# Patient Record
Sex: MALE | HISPANIC OR LATINO | Employment: FULL TIME | URBAN - METROPOLITAN AREA
[De-identification: names, ages, dates, MRNs, and addresses within clinical notes are randomized per-mention and may not be internally consistent; named-entity substitution may affect disease eponyms.]

---

## 2017-02-15 ENCOUNTER — OFFICE VISIT (OUTPATIENT)
Dept: URGENT CARE | Facility: CLINIC | Age: 37
End: 2017-02-15
Payer: COMMERCIAL

## 2017-02-15 VITALS
TEMPERATURE: 98 F | DIASTOLIC BLOOD PRESSURE: 70 MMHG | HEIGHT: 70 IN | OXYGEN SATURATION: 97 % | BODY MASS INDEX: 28.92 KG/M2 | HEART RATE: 77 BPM | RESPIRATION RATE: 14 BRPM | SYSTOLIC BLOOD PRESSURE: 118 MMHG | WEIGHT: 202 LBS

## 2017-02-15 DIAGNOSIS — J02.0 STREP THROAT: ICD-10-CM

## 2017-02-15 LAB
INT CON NEG: NEGATIVE
INT CON POS: POSITIVE
S PYO AG THROAT QL: POSITIVE

## 2017-02-15 PROCEDURE — 87880 STREP A ASSAY W/OPTIC: CPT | Performed by: PHYSICIAN ASSISTANT

## 2017-02-15 PROCEDURE — 99214 OFFICE O/P EST MOD 30 MIN: CPT | Performed by: PHYSICIAN ASSISTANT

## 2017-02-15 RX ORDER — FLUTICASONE PROPIONATE 50 MCG
1 SPRAY, SUSPENSION (ML) NASAL 2 TIMES DAILY
Qty: 16 G | Refills: 0 | Status: SHIPPED | OUTPATIENT
Start: 2017-02-15 | End: 2021-12-20

## 2017-02-15 RX ORDER — AMOXICILLIN AND CLAVULANATE POTASSIUM 875; 125 MG/1; MG/1
1 TABLET, FILM COATED ORAL 2 TIMES DAILY
Qty: 20 TAB | Refills: 0 | Status: SHIPPED | OUTPATIENT
Start: 2017-02-15 | End: 2021-12-20

## 2017-02-15 NOTE — PROGRESS NOTES
Chief Complaint   Patient presents with   • Pharyngitis     x 1 month, pt states is worse at night       HISTORY OF PRESENT ILLNESS: Patient is a 36 y.o. male who presents today for approx 1 month of waxing and waning severity of sore throat.  Overall it is getting worse and overall it is worse at night.  He states he has had some sinus pressure that seems to swell and alternate daily.  He has not had fevers.   Has not had nausea or vomiting.   No abdominal pain but has been gassy.  He has used Afrin with minimal improvement.    Throat feels swollen.       There are no active problems to display for this patient.      Allergies:Review of patient's allergies indicates no known allergies.    Current Outpatient Prescriptions Ordered in [facility]   Medication Sig Dispense Refill   • hydrocodone-acetaminophen (NORCO) 5-325 MG Tab per tablet Take 1-2 Tabs by mouth every four hours as needed. 35 Tab 0   • Naphazoline-Pheniramine (NAPHCON-A OP) by Ophthalmic route.     • Ibuprofen (ADVIL) 200 MG CAPS Take  by mouth.     • polymixin-trimethoprim (POLYTRIM) 16610-9.1 UNIT/ML-% SOLN Place 2 Drops in left eye. One acute drops to affected eye every 4-6 hours for 10 days. 1 Bottle 0   • NON SPECIFIED Gambian PAIN PILLS        No current Epic-ordered facility-administered medications on file.       Past Medical History   Diagnosis Date   • Acid reflux        Social History   Substance Use Topics   • Smoking status: Never Smoker    • Smokeless tobacco: None   • Alcohol Use: No       No family status information on file.   History reviewed. No pertinent family history.    ROS:  Review of Systems   Constitutional: Negative for fever, chills, weight loss and malaise/fatigue.   HENT: SEE HPI  Eyes: Negative for blurred vision.   Respiratory: Negative for cough, sputum production, shortness of breath and wheezing.    Cardiovascular: Negative for chest pain, palpitations, orthopnea and leg swelling.   Gastrointestinal: Negative for heartburn,  "nausea, vomiting and abdominal pain.   All other systems reviewed and are negative.       Exam:  Blood pressure 118/70, pulse 77, temperature 36.7 °C (98 °F), resp. rate 14, height 1.765 m (5' 9.5\"), weight 91.627 kg (202 lb), SpO2 97 %.  General:  Well nourished, well developed male in NAD  Eyes: PERRLA, EOM within normal limits, no conjunctival injection, no scleral icterus, visual fields and acuity grossly intact.  Ears: Normal shape and symmetry, no tenderness, no discharge. External canals are without any significant edema or erythema. Tympanic membranes are without any inflammation, no effusion. Gross auditory acuity is intact  Nose: Symmetrical, sinuses without tenderness, boggy turbinates, clear rhinorrhea.   Mouth: reasonable hygiene, mild diffuse erythema without exudates and mild bilateral tonsillar enlargement.   Neck: no masses, range of motion within normal limits, no tracheal deviation. No lymphadenopathy  Pulmonary: Normal respiratory effort, no wheezes, crackles, or rhonchi.  Cardiovascular: regular rate and rhythm without murmurs, rubs, or gallops.  Skin: No visible rashes or lesion. Warm, pink, dry.   Extremities: no clubbing, cyanosis, or edema.  Neuro: A&O x 3. Speech normal/clear.  Normal gait.         Assessment/Plan:  1. Strep throat  POCT Rapid Strep A    amoxicillin-clavulanate (AUGMENTIN) 875-125 MG Tab    fluticasone (FLONASE) 50 MCG/ACT nasal spray       -POCT strep -POS  -fluids emphasized. Alternating Tylenol/Motrin prn pain/inflammation/fever  -additionally discussed sinus care.  Recommend stop Afrin and will trial Flonase.   -salt gargles.   -new tooth brush.  -RTC precautions discussed such as worsening sore throat despite abx, worsening fevers, increased difficulty swallowing or breathing, drooling, etc.     Supportive care, differential diagnoses, and indications for immediate follow-up discussed with patient.   Pathogenesis of diagnosis discussed including typical length and " natural progression.   Instructed to return to clinic or nearest emergency department for any change in condition, further concerns, or worsening of symptoms.  Patient states understanding of the plan of care and discharge instructions.  Instructed to make an appointment, for follow up, with their primary care provider.      Caitlyn Evans PA-C

## 2017-02-15 NOTE — MR AVS SNAPSHOT
"        Holden Chelo   2/15/2017 8:15 AM   Office Visit   MRN: [id number]    Department:  [facility]   Dept Phone:  [phone]    Description:  Male : 1980   Provider:  Caitlyn Evans PA-C           Reason for Visit     Pharyngitis x 1 month, pt states is worse at night      Allergies as of 2/15/2017     No Known Allergies      You were diagnosed with     Acute pharyngitis due to other specified organisms   [[id number]]         Vital Signs     Blood Pressure Pulse Temperature Respirations Height Weight    118/70 mmHg 77 36.7 °C (98 °F) 14 1.765 m (5' 9.5\") 91.627 kg (202 lb)    Body Mass Index Oxygen Saturation                29.41 kg/m2 97%          Basic Information     Date Of Birth Sex Race Ethnicity Preferred Language    1980 Male  or   Origin (Turkish,Qatari,Kazakh,Mickey, etc) English      Health Maintenance        Date Due Completion Dates    IMM DTaP/Tdap/Td Vaccine (1 - Tdap) 3/21/1998 ---    IMM INFLUENZA (1) 2016 ---            Current Immunizations     No immunizations on file.      Below and/or attached are the medications your provider expects you to take. Review all of your home medications and newly ordered medications with your provider and/or pharmacist. Follow medication instructions as directed by your provider and/or pharmacist. Please keep your medication list with you and share with your provider. Update the information when medications are discontinued, doses are changed, or new medications (including over-the-counter products) are added; and carry medication information at all times in the event of emergency situations     Allergies:  No Known Allergies          Medications  Valid as of: February 15, 2017 -  8:23 AM    Generic Name Brand Name Tablet Size Instructions for use    Hydrocodone-Acetaminophen (Tab) NORCO 5-325 MG Take 1-2 Tabs by mouth every four hours as needed.        Ibuprofen (Cap) Ibuprofen 200 MG Take  by mouth.       " Naphazoline-Pheniramine   by Ophthalmic route.        NON SPECIFIED   Panamanian PAIN PILLS         Polymyxin B-Trimethoprim (Solution) POLYTRIM 10724-8.1 UNIT/ML-% Place 2 Drops in left eye. One acute drops to affected eye every 4-6 hours for 10 days.        .                 Medicines prescribed today were sent to:     [facility] [facility] [place] - [address]    [address] [place] [address]    Phone: [phone] Fax: [phone]    Open 24 Hours?: No      Medication refill instructions:       If your prescription bottle indicates you have medication refills left, it is not necessary to call your provider’s office. Please contact your pharmacy and they will refill your medication.    If your prescription bottle indicates you do not have any refills left, you may request refills at any time through one of the following ways: The online BrightArch system (except Urgent Care), by calling your provider’s office, or by asking your pharmacy to contact your provider’s office with a refill request. Medication refills are processed only during regular business hours and may not be available until the next business day. Your provider may request additional information or to have a follow-up visit with you prior to refilling your medication.   *Please Note: Medication refills are assigned a new Rx number when refilled electronically. Your pharmacy may indicate that no refills were authorized even though a new prescription for the same medication is available at the pharmacy. Please request the medicine by name with the pharmacy before contacting your provider for a refill.           BrightArch Access Code: [email or web address]  Expires: 3/17/2017  8:23 AM    BrightArch  A secure, online tool to manage your health information     [employer]s BrightArch® is a secure, online tool that connects you to your personalized health information from the privacy of your home -- day or night - making it very easy for you to manage your  healthcare. Once the activation process is completed, you can even access your medical information using the Crackle darrell, which is available for free in the Apple Darrell store or Google Play store.     Crackle provides the following levels of access (as shown below):   My Chart Features   [facility] Doctor [facility]  Specialists [facility]  Urgent  Care Non-[facility]  Primary Care  Doctor   Email your healthcare team securely and privately 24/7 X X X    Manage appointments: schedule your next appointment; view details of past/upcoming appointments X      Request prescription refills. X      View recent personal medical records, including lab and immunizations X X X X   View health record, including health history, allergies, medications X X X X   Read reports about your outpatient visits, procedures, consult and ER notes X X X X   See your discharge summary, which is a recap of your hospital and/or ER visit that includes your diagnosis, lab results, and care plan. X X       How to register for Crackle:  1. Go to  [email or web address].  2. Click on the Sign Up Now box, which takes you to the New Member Sign Up page. You will need to provide the following information:  a. Enter your Crackle Access Code exactly as it appears at the top of this page. (You will not need to use this code after you’ve completed the sign-up process. If you do not sign up before the expiration date, you must request a new code.)   b. Enter your date of birth.   c. Enter your home email address.   d. Click Submit, and follow the next screen’s instructions.  3. Create a Crackle ID. This will be your Crackle login ID and cannot be changed, so think of one that is secure and easy to remember.  4. Create a Crackle password. You can change your password at any time.  5. Enter your Password Reset Question and Answer. This can be used at a later time if you forget your password.   6. Enter your e-mail address. This allows you to receive e-mail  notifications when new information is available in [employer]Mobhart.  7. Click Sign Up. You can now view your health information.    For assistance activating your [employer] account, call [phone]

## 2017-04-14 ENCOUNTER — OFFICE VISIT (OUTPATIENT)
Dept: URGENT CARE | Facility: CLINIC | Age: 37
End: 2017-04-14

## 2017-04-14 VITALS
WEIGHT: 202 LBS | RESPIRATION RATE: 16 BRPM | SYSTOLIC BLOOD PRESSURE: 126 MMHG | OXYGEN SATURATION: 96 % | HEIGHT: 69 IN | DIASTOLIC BLOOD PRESSURE: 64 MMHG | HEART RATE: 72 BPM | BODY MASS INDEX: 29.92 KG/M2 | TEMPERATURE: 97.9 F

## 2017-04-14 DIAGNOSIS — R10.84 GENERALIZED ABDOMINAL PAIN: ICD-10-CM

## 2017-04-14 DIAGNOSIS — R19.7 DIARRHEA, UNSPECIFIED TYPE: ICD-10-CM

## 2017-04-14 DIAGNOSIS — R14.0 GENERALIZED BLOATING: ICD-10-CM

## 2017-04-14 LAB
APPEARANCE UR: CLEAR
BILIRUB UR STRIP-MCNC: NORMAL MG/DL
COLOR UR AUTO: YELLOW
GLUCOSE UR STRIP.AUTO-MCNC: NORMAL MG/DL
KETONES UR STRIP.AUTO-MCNC: NORMAL MG/DL
LEUKOCYTE ESTERASE UR QL STRIP.AUTO: NORMAL
NITRITE UR QL STRIP.AUTO: NORMAL
PH UR STRIP.AUTO: 5 [PH] (ref 5–8)
PROT UR QL STRIP: NORMAL MG/DL
RBC UR QL AUTO: NORMAL
SP GR UR STRIP.AUTO: 1.03
UROBILINOGEN UR STRIP-MCNC: NORMAL MG/DL

## 2017-04-14 PROCEDURE — 81002 URINALYSIS NONAUTO W/O SCOPE: CPT | Performed by: NURSE PRACTITIONER

## 2017-04-14 PROCEDURE — 99214 OFFICE O/P EST MOD 30 MIN: CPT | Performed by: NURSE PRACTITIONER

## 2017-04-14 RX ORDER — LOPERAMIDE HYDROCHLORIDE 2 MG/1
2 CAPSULE ORAL 4 TIMES DAILY PRN
Qty: 30 CAP | Refills: 0 | Status: SHIPPED | OUTPATIENT
Start: 2017-04-14 | End: 2021-12-20

## 2017-04-14 RX ORDER — SIMETHICONE 125 MG
1 CAPSULE ORAL
Qty: 28 CAP | Refills: 0 | Status: SHIPPED | OUTPATIENT
Start: 2017-04-14 | End: 2021-12-20

## 2017-04-14 ASSESSMENT — ENCOUNTER SYMPTOMS
FEVER: 0
HEADACHES: 0
BLOOD IN STOOL: 0
VOMITING: 0
DIARRHEA: 1
NAUSEA: 0
WEAKNESS: 0
CONSTIPATION: 0
BACK PAIN: 0
PALPITATIONS: 0
MYALGIAS: 0
ABDOMINAL PAIN: 1
CHILLS: 0
DIZZINESS: 0
SHORTNESS OF BREATH: 0
ORTHOPNEA: 0

## 2017-04-14 NOTE — PROGRESS NOTES
"Subjective:      Holden Whitney is a 37 y.o. male who presents with Diarrhea            Diarrhea   Associated symptoms include abdominal pain. Pertinent negatives include no chills, fever, headaches, myalgias or vomiting.   Holden is a 37 year old male who is here for diarrhea and abdominal pain since last night. Diarrhea this morning \"4 times\" but has not eaten or drank anything today. Cramping. Distended. Denies n/v. Diarrhea last night after dinner 5 hours later. Denies fever. Queasy feeing. Denies dizziness.     PMH:  has a past medical history of Acid reflux.  MEDS:   Current outpatient prescriptions:   •  amoxicillin-clavulanate (AUGMENTIN) 875-125 MG Tab, Take 1 Tab by mouth 2 times a day., Disp: 20 Tab, Rfl: 0  •  fluticasone (FLONASE) 50 MCG/ACT nasal spray, Spray 1 Spray in nose 2 times a day., Disp: 16 g, Rfl: 0  •  hydrocodone-acetaminophen (NORCO) 5-325 MG Tab per tablet, Take 1-2 Tabs by mouth every four hours as needed., Disp: 35 Tab, Rfl: 0  •  Naphazoline-Pheniramine (NAPHCON-A OP), by Ophthalmic route., Disp: , Rfl:   •  Ibuprofen (ADVIL) 200 MG CAPS, Take  by mouth., Disp: , Rfl:   •  polymixin-trimethoprim (POLYTRIM) 39853-6.1 UNIT/ML-% SOLN, Place 2 Drops in left eye. One acute drops to affected eye every 4-6 hours for 10 days., Disp: 1 Bottle, Rfl: 0  •  NON SPECIFIED, Danish PAIN PILLS , Disp: , Rfl:   ALLERGIES: No Known Allergies  SURGHX:   Past Surgical History   Procedure Laterality Date   • Appendectomy laparoscopic N/A 1/14/2016     Procedure: APPENDECTOMY LAPAROSCOPIC;  Surgeon: Geo Knutson M.D.;  Location: SURGERY [facility];  Service:      SOCHX:  reports that he has never smoked. He does not have any smokeless tobacco history on file. He reports that he does not drink alcohol or use illicit drugs.  FH: Family history was reviewed, no pertinent findings to report      Review of Systems   Constitutional: Negative for fever, chills and malaise/fatigue.   Respiratory: Negative for " "shortness of breath.    Cardiovascular: Negative for chest pain, palpitations and orthopnea.   Gastrointestinal: Positive for abdominal pain and diarrhea. Negative for nausea, vomiting, constipation and blood in stool.   Genitourinary: Negative for dysuria.   Musculoskeletal: Negative for myalgias and back pain.   Neurological: Negative for dizziness, weakness and headaches.   All other systems reviewed and are negative.         Objective:     /64 mmHg  Pulse 72  Temp(Src) 36.6 °C (97.9 °F)  Resp 16  Ht 1.753 m (5' 9.02\")  Wt 91.627 kg (202 lb)  BMI 29.82 kg/m2  SpO2 96%     Physical Exam   Constitutional: He is oriented to person, place, and time. Vital signs are normal. He appears well-developed and well-nourished.  Non-toxic appearance. He does not have a sickly appearance. He does not appear ill. No distress.   HENT:   Head: Normocephalic.   Eyes: Conjunctivae and EOM are normal. Pupils are equal, round, and reactive to light.   Neck: Normal range of motion. Neck supple.   Cardiovascular: Normal rate and regular rhythm.    Pulmonary/Chest: Effort normal and breath sounds normal.   Abdominal: Soft. Bowel sounds are normal. He exhibits no distension. There is no tenderness. There is no rigidity, no rebound, no guarding and no CVA tenderness.   Musculoskeletal: Normal range of motion.   Lymphadenopathy:     He has no cervical adenopathy.   Neurological: He is alert and oriented to person, place, and time.   Skin: Skin is warm and dry. He is not diaphoretic.   Vitals reviewed.    POC Color yellow Negative Final      POC Appearance clear Negative Final     POC Leukocyte Esterase n Negative Final     POC Nitrites n Negative Final     POC Urobiligen n Negative (0.2) mg/dL Final     POC Protein n Negative mg/dL Final     POC Urine PH 5.0 5.0 - 8.0 Final     POC Blood n Negative Final     POC Specific Gravity 1.030 <1.005 - >1.030 Final     POC Ketones mod Negative mg/dL Final     POC Biliruben n Negative " mg/dL Final     POC Glucose n Negative mg/dL Final               Assessment/Plan:     1. Generalized abdominal pain    - POCT Urinalysis    2. Diarrhea, unspecified type    - loperamide (IMODIUM) 2 MG Cap; Take 1 Cap by mouth 4 times a day as needed for Diarrhea.  Dispense: 30 Cap; Refill: 0  - Simethicone 125 MG Cap; Take 1 Cap by mouth 4 Times a Day,Before Meals and at Bedtime.  Dispense: 28 Cap; Refill: 0    3. Generalized bloating    - loperamide (IMODIUM) 2 MG Cap; Take 1 Cap by mouth 4 times a day as needed for Diarrhea.  Dispense: 30 Cap; Refill: 0  - Simethicone 125 MG Cap; Take 1 Cap by mouth 4 Times a Day,Before Meals and at Bedtime.  Dispense: 28 Cap; Refill: 0    Maintain water status slowly with sips of water and electrolyte fluid, increase to larger amounts as tolerated  Introduce solid foods when diarrhea ceases and becomes firmer without abdominal cramping. Eat items from the BRAT diet- trying small amount with one item at a time  May use Lanolin, diaper rash ointment or Vaseline to soothe anus for raw skin  Monitor for fever, increased abdominal pain, n/v, lethargy, continued diarrhea- need re-evaluation

## 2017-04-14 NOTE — MR AVS SNAPSHOT
"        Holden Whitney   2017 2:15 PM   Office Visit   MRN: [id number]    Department:  [facility]   Dept Phone:  [phone]    Description:  Male : 1980   Provider:  JOSEPH Jonas           Reason for Visit     Diarrhea w/generalized abdominal pain x 1 day      Allergies as of 2017     No Known Allergies      You were diagnosed with     Generalized abdominal pain   [[id number]]       Diarrhea, unspecified type   [5683770]       Generalized bloating   [[id number]]         Vital Signs     Blood Pressure Pulse Temperature Respirations Height Weight    126/64 mmHg 72 36.6 °C (97.9 °F) 16 1.753 m (5' 9.02\") 91.627 kg (202 lb)    Body Mass Index Oxygen Saturation Smoking Status             29.82 kg/m2 96% Never Smoker          Basic Information     Date Of Birth Sex Race Ethnicity Preferred Language    1980 Male  or   Origin (Guamanian,Singaporean,Surinamese,British [place] Islander, etc) English      Health Maintenance        Date Due Completion Dates    IMM DTaP/Tdap/Td Vaccine (1 - Tdap) 3/21/1998 ---            Results     POCT Urinalysis      Component Value Standard Range & Units    POC Color yellow Negative    POC Appearance clear Negative    POC Leukocyte Esterase n Negative    POC Nitrites n Negative    POC Urobiligen n Negative (0.2) mg/dL    POC Protein n Negative mg/dL    POC Urine PH 5.0 5.0 - 8.0    POC Blood n Negative    POC Specific Gravity 1.030 <1.005 - >1.030    POC Ketones mod Negative mg/dL    POC Biliruben n Negative mg/dL    POC Glucose n Negative mg/dL                        Current Immunizations     No immunizations on file.      Below and/or attached are the medications your provider expects you to take. Review all of your home medications and newly ordered medications with your provider and/or pharmacist. Follow medication instructions as directed by your provider and/or pharmacist. Please keep your medication list with you and share with your provider. Update the " information when medications are discontinued, doses are changed, or new medications (including over-the-counter products) are added; and carry medication information at all times in the event of emergency situations     Allergies:  No Known Allergies          Medications  Valid as of: April 14, 2017 -  3:15 PM    Generic Name Brand Name Tablet Size Instructions for use    Amoxicillin-Pot Clavulanate (Tab) AUGMENTIN 875-125 MG Take 1 Tab by mouth 2 times a day.        Fluticasone Propionate (Suspension) FLONASE 50 MCG/ACT Spray 1 Spray in nose 2 times a day.        Hydrocodone-Acetaminophen (Tab) NORCO 5-325 MG Take 1-2 Tabs by mouth every four hours as needed.        Ibuprofen (Cap) Ibuprofen 200 MG Take  by mouth.        Loperamide HCl (Cap) IMODIUM 2 MG Take 1 Cap by mouth 4 times a day as needed for Diarrhea.        Naphazoline-Pheniramine   by Ophthalmic route.        NON SPECIFIED   Spanish PAIN PILLS         Polymyxin B-Trimethoprim (Solution) POLYTRIM 79654-0.1 UNIT/ML-% Place 2 Drops in left eye. One acute drops to affected eye every 4-6 hours for 10 days.        Simethicone (Cap) Simethicone 125 MG Take 1 Cap by mouth 4 Times a Day,Before Meals and at Bedtime.        .                 Medicines prescribed today were sent to:     [facility] [address] [place], [place] [address] [address] [facility] [address] [place] [address]    Phone: [phone] Fax: [phone]    Open 24 Hours?: No      Medication refill instructions:       If your prescription bottle indicates you have medication refills left, it is not necessary to call your provider’s office. Please contact your pharmacy and they will refill your medication.    If your prescription bottle indicates you do not have any refills left, you may request refills at any time through one of the following ways: The online Decisyon system (except Urgent Care), by calling your provider’s office, or by asking your pharmacy to contact your provider’s office with a refill request.  Medication refills are processed only during regular business hours and may not be available until the next business day. Your provider may request additional information or to have a follow-up visit with you prior to refilling your medication.   *Please Note: Medication refills are assigned a new Rx number when refilled electronically. Your pharmacy may indicate that no refills were authorized even though a new prescription for the same medication is available at the pharmacy. Please request the medicine by name with the pharmacy before contacting your provider for a refill.           MyChart Status: Patient Declined

## 2021-12-20 ENCOUNTER — OFFICE VISIT (OUTPATIENT)
Dept: URGENT CARE | Facility: PHYSICIAN GROUP | Age: 41
End: 2021-12-20
Payer: COMMERCIAL

## 2021-12-20 VITALS
OXYGEN SATURATION: 97 % | HEART RATE: 85 BPM | HEIGHT: 66 IN | WEIGHT: 200.6 LBS | DIASTOLIC BLOOD PRESSURE: 72 MMHG | RESPIRATION RATE: 16 BRPM | SYSTOLIC BLOOD PRESSURE: 120 MMHG | BODY MASS INDEX: 32.24 KG/M2 | TEMPERATURE: 98 F

## 2021-12-20 DIAGNOSIS — J02.9 SORE THROAT: ICD-10-CM

## 2021-12-20 DIAGNOSIS — R11.2 NAUSEA AND VOMITING, INTRACTABILITY OF VOMITING NOT SPECIFIED, UNSPECIFIED VOMITING TYPE: ICD-10-CM

## 2021-12-20 DIAGNOSIS — J02.0 STREP THROAT: Primary | ICD-10-CM

## 2021-12-20 DIAGNOSIS — R19.7 DIARRHEA, UNSPECIFIED TYPE: ICD-10-CM

## 2021-12-20 DIAGNOSIS — R10.9 ABDOMINAL PAIN, UNSPECIFIED ABDOMINAL LOCATION: ICD-10-CM

## 2021-12-20 LAB
INT CON NEG: NORMAL
INT CON POS: NORMAL
S PYO AG THROAT QL: POSITIVE

## 2021-12-20 PROCEDURE — 87880 STREP A ASSAY W/OPTIC: CPT | Performed by: NURSE PRACTITIONER

## 2021-12-20 PROCEDURE — 99214 OFFICE O/P EST MOD 30 MIN: CPT | Performed by: NURSE PRACTITIONER

## 2021-12-20 RX ORDER — ONDANSETRON 4 MG/1
4 TABLET, ORALLY DISINTEGRATING ORAL EVERY 8 HOURS PRN
Qty: 20 TABLET | Refills: 0 | Status: SHIPPED | OUTPATIENT
Start: 2021-12-20 | End: 2022-01-31

## 2021-12-20 RX ORDER — AMOXICILLIN 500 MG/1
500 CAPSULE ORAL 2 TIMES DAILY
Qty: 20 CAPSULE | Refills: 0 | Status: SHIPPED | OUTPATIENT
Start: 2021-12-20 | End: 2021-12-30

## 2021-12-20 RX ORDER — DIPHENOXYLATE HYDROCHLORIDE AND ATROPINE SULFATE 2.5; .025 MG/1; MG/1
1 TABLET ORAL 4 TIMES DAILY PRN
Qty: 20 TABLET | Refills: 0 | Status: SHIPPED | OUTPATIENT
Start: 2021-12-20 | End: 2021-12-25

## 2021-12-20 ASSESSMENT — ENCOUNTER SYMPTOMS
SHORTNESS OF BREATH: 0
ABDOMINAL PAIN: 1
DIARRHEA: 1
NAUSEA: 1
ROS GI COMMENTS: NO CHANGE IN GI
VOMITING: 0
MYALGIAS: 0
CHILLS: 0
FEVER: 0
SENSORY CHANGE: 0
FOCAL WEAKNESS: 0
BACK PAIN: 0
CONSTIPATION: 0
TINGLING: 0
HEADACHES: 0
COUGH: 0
SORE THROAT: 1

## 2021-12-20 ASSESSMENT — LIFESTYLE VARIABLES: SUBSTANCE_ABUSE: 0

## 2021-12-20 NOTE — LETTER
Holden Whitney had an appointment with us today 12/20/2021. Please excuse Walter Ruiz from work today as he had to accompany the patient to his medical appointment.        Thank you,         Adriana Marmolejo, MADELIENEPParrisN.  Electronically Signed

## 2021-12-20 NOTE — LETTER
December 20, 2021       Patient: Holden Whitney   YOB: 1980   Date of Visit: 12/20/2021         To Whom It May Concern:    In my medical opinion, I recommend that Holden Whitney return to full duty,  tomorrow without work restrictions.    If you have any questions or concerns, please don't hesitate to call [phone]          Sincerely,          Adriana Marmolejo, A.P.N.  Electronically Signed

## 2021-12-20 NOTE — PROGRESS NOTES
Holden Whitney is a 41 y.o. male who presents for Abdominal Pain (diarrhea, sore throat, nausea, vomiting, x2 days )    : Abdias MONK   HPI this new problem.  Urinalysis 41-year-old male patient presents with abdominal pain, diarrhea, sore throat, nausea, vomiting for 2 days.  Symptoms are slowly getting worse.  He has been taking diclofenac to reduce the inflammation in his stomach.  He reports that he is having diarrhea more than 10 times a day.  There is no blood.  He has vomiting about 5 times a day.  He has had abdominal cramping.  His throat is very sore.  He does not drink alcohol.  He has no ill contacts.  No exposure to persons with strep throat or other infectious diseases.  He has not had any antibiotics in over 6 months.  No other aggravating or alleviating factors.    Review of Systems   Constitutional: Negative for chills, fever and malaise/fatigue.   HENT: Positive for sore throat. Negative for congestion.    Respiratory: Negative for cough and shortness of breath.    Cardiovascular: Negative for chest pain.   Gastrointestinal: Positive for abdominal pain, diarrhea and nausea. Negative for constipation and vomiting.        No change in GI   Genitourinary: Negative for dysuria and frequency.        No change in    Musculoskeletal: Negative for back pain and myalgias.   Skin: Negative for rash.   Neurological: Negative for tingling, sensory change, focal weakness and headaches.   Endo/Heme/Allergies: Negative for environmental allergies.   Psychiatric/Behavioral: Negative for substance abuse.       Allergies:     No Known Allergies    PMSFS Hx:  Past Medical History:   Diagnosis Date   • Acid reflux      Past Surgical History:   Procedure Laterality Date   • APPENDECTOMY LAPAROSCOPIC N/A 1/14/2016    Procedure: APPENDECTOMY LAPAROSCOPIC;  Surgeon: Geo Knutson M.D.;  Location: SURGERY [facility];  Service:      History reviewed. No pertinent family history.  Social History  "    Tobacco Use   • Smoking status: Never Smoker   • Smokeless tobacco: Never Used   Substance Use Topics   • Alcohol use: No       Problems:   There is no problem list on file for this patient.      Medications:   No current outpatient medications on file prior to visit.     No current facility-administered medications on file prior to visit.          Objective:     /72 (BP Location: Left arm, Patient Position: Sitting, BP Cuff Size: Adult)   Pulse 85   Temp 36.7 °C (98 °F) (Temporal)   Resp 16   Ht 1.676 m (5' 6\")   Wt 91 kg (200 lb 9.6 oz)   SpO2 97%   BMI 32.38 kg/m²     Physical Exam  Vitals and nursing note reviewed.   Constitutional:       General: He is not in acute distress.     Appearance: Normal appearance. He is well-developed and normal weight.   HENT:      Head: Normocephalic.      Mouth/Throat:      Mouth: Mucous membranes are moist.      Pharynx: Posterior oropharyngeal erythema present. No oropharyngeal exudate.   Cardiovascular:      Rate and Rhythm: Normal rate and regular rhythm.      Pulses: Normal pulses.      Heart sounds: Normal heart sounds.   Pulmonary:      Effort: Pulmonary effort is normal.      Breath sounds: Normal breath sounds.   Chest:   Breasts:      Right: No supraclavicular adenopathy.      Left: No supraclavicular adenopathy.       Abdominal:      General: There is no distension.      Palpations: Abdomen is soft.      Tenderness: There is abdominal tenderness (generalized). There is no right CVA tenderness, left CVA tenderness, guarding or rebound.   Musculoskeletal:      Cervical back: Normal range of motion and neck supple.   Lymphadenopathy:      Head:      Right side of head: No tonsillar adenopathy.      Left side of head: No tonsillar adenopathy.      Cervical: No cervical adenopathy.      Upper Body:      Right upper body: No supraclavicular adenopathy.      Left upper body: No supraclavicular adenopathy.   Skin:     General: Skin is warm and dry.      " Capillary Refill: Capillary refill takes less than 2 seconds.   Neurological:      Mental Status: He is alert and oriented to person, place, and time.   Psychiatric:         Mood and Affect: Mood normal.         Speech: Speech normal.         Behavior: Behavior normal. Behavior is cooperative.         Thought Content: Thought content normal.       Rapid Strep A : positive      Assessment /Associated Orders:      1. Strep throat  amoxicillin (AMOXIL) 500 MG Cap   2. Diarrhea, unspecified type  diphenoxylate-atropine (LOMOTIL) 2.5-0.025 MG Tab   3. Nausea and vomiting, intractability of vomiting not specified, unspecified vomiting type  ondansetron (ZOFRAN ODT) 4 MG TABLET DISPERSIBLE   4. Abdominal pain, unspecified abdominal location     5. Sore throat  POCT Rapid Strep A         Medical Decision Making:    Pt is clinically stable at today's acute urgent care visit.  No acute distress noted. Appropriate for outpatient management at this time.   Acute problem today with uncertain prognosis.     Educated in proper administration of medication(s) ordered today including safety, possible SE, risks, benefits, rationale and alternatives to therapy.     Keep well hydrated    Pateros diet until sx improve    OTC  analgesic of choice (acetaminophen or NSAID). Follow manufactures dosing and safety precautions.     Stop diclofenac     Advised to follow-up with the primary care provider for recheck, reevaluation, and consideration of further management if necessary.   Discussed management options (risks,benefits, and alternatives to treatment). Expressed understanding and the treatment plan was agreed upon. Questions were encouraged and answered   Return to urgent care prn if new or worsening sx or if there is no improvement in condition prn.    Educated in Red flags and indications to immediately call 911 or present to the Emergency Department.     I personally reviewed prior external notes and test results pertinent to today's  visit.  I have independently reviewed and interpreted all diagnostics ordered during this urgent care acute visit.   Time spent evaluating this patient was at least 30 minutes and includes preparing for visit, counseling/education, exam and evaluation, obtaining history, independent interpretation, ordering lab/test/procedures,medication management and documentation.Time does not include separately billable procedures noted .

## 2022-01-31 ENCOUNTER — OFFICE VISIT (OUTPATIENT)
Dept: URGENT CARE | Facility: PHYSICIAN GROUP | Age: 42
End: 2022-01-31
Payer: COMMERCIAL

## 2022-01-31 VITALS
DIASTOLIC BLOOD PRESSURE: 70 MMHG | BODY MASS INDEX: 32.56 KG/M2 | HEIGHT: 66 IN | SYSTOLIC BLOOD PRESSURE: 112 MMHG | RESPIRATION RATE: 16 BRPM | HEART RATE: 75 BPM | WEIGHT: 202.6 LBS | TEMPERATURE: 98.2 F | OXYGEN SATURATION: 96 %

## 2022-01-31 DIAGNOSIS — M25.512 ACUTE PAIN OF LEFT SHOULDER: ICD-10-CM

## 2022-01-31 DIAGNOSIS — K58.2 IRRITABLE BOWEL SYNDROME WITH BOTH CONSTIPATION AND DIARRHEA: ICD-10-CM

## 2022-01-31 DIAGNOSIS — K21.9 GASTROESOPHAGEAL REFLUX DISEASE, UNSPECIFIED WHETHER ESOPHAGITIS PRESENT: ICD-10-CM

## 2022-01-31 PROCEDURE — 99214 OFFICE O/P EST MOD 30 MIN: CPT | Performed by: STUDENT IN AN ORGANIZED HEALTH CARE EDUCATION/TRAINING PROGRAM

## 2022-01-31 RX ORDER — PANTOPRAZOLE SODIUM 40 MG/1
40 TABLET, DELAYED RELEASE ORAL DAILY
Qty: 30 TABLET | Refills: 1 | Status: SHIPPED | OUTPATIENT
Start: 2022-01-31

## 2022-01-31 RX ORDER — DICYCLOMINE HCL 20 MG
TABLET ORAL
Qty: 30 TABLET | Refills: 0 | Status: SHIPPED | OUTPATIENT
Start: 2022-01-31

## 2022-01-31 RX ORDER — POLYETHYLENE GLYCOL 3350 17 G/17G
17 POWDER, FOR SOLUTION ORAL DAILY
Qty: 510 G | Refills: 1 | Status: SHIPPED | OUTPATIENT
Start: 2022-01-31

## 2022-01-31 NOTE — LETTER
January 31, 2022       Patient: Holden Whitney   YOB: 1980   Date of Visit: 1/31/2022         To Whom It May Concern:    Holden Whitney was seen for his doctor's appointment on the morning of 1/31/22     If you have any questions or concerns, please don't hesitate to call [phone]          Sincerely,          Robert Alegria D.O.  Electronically Signed

## 2022-12-01 ENCOUNTER — HOSPITAL ENCOUNTER (EMERGENCY)
Facility: MEDICAL CENTER | Age: 42
End: 2022-12-01
Attending: EMERGENCY MEDICINE
Payer: COMMERCIAL

## 2022-12-01 ENCOUNTER — APPOINTMENT (OUTPATIENT)
Dept: RADIOLOGY | Facility: MEDICAL CENTER | Age: 42
End: 2022-12-01
Attending: EMERGENCY MEDICINE
Payer: COMMERCIAL

## 2022-12-01 VITALS
OXYGEN SATURATION: 98 % | BODY MASS INDEX: 32.42 KG/M2 | HEIGHT: 66 IN | SYSTOLIC BLOOD PRESSURE: 127 MMHG | RESPIRATION RATE: 16 BRPM | HEART RATE: 97 BPM | WEIGHT: 201.72 LBS | DIASTOLIC BLOOD PRESSURE: 85 MMHG | TEMPERATURE: 97.4 F

## 2022-12-01 DIAGNOSIS — M54.6 MIDLINE THORACIC BACK PAIN, UNSPECIFIED CHRONICITY: ICD-10-CM

## 2022-12-01 DIAGNOSIS — J02.9 VIRAL PHARYNGITIS: ICD-10-CM

## 2022-12-01 DIAGNOSIS — K64.9 HEMORRHOIDS, UNSPECIFIED HEMORRHOID TYPE: ICD-10-CM

## 2022-12-01 DIAGNOSIS — M54.50 LUMBAR BACK PAIN: ICD-10-CM

## 2022-12-01 LAB — S PYO DNA SPEC NAA+PROBE: NOT DETECTED

## 2022-12-01 PROCEDURE — 72131 CT LUMBAR SPINE W/O DYE: CPT

## 2022-12-01 PROCEDURE — 99283 EMERGENCY DEPT VISIT LOW MDM: CPT

## 2022-12-01 PROCEDURE — 700111 HCHG RX REV CODE 636 W/ 250 OVERRIDE (IP): Performed by: EMERGENCY MEDICINE

## 2022-12-01 PROCEDURE — 96372 THER/PROPH/DIAG INJ SC/IM: CPT

## 2022-12-01 PROCEDURE — 72128 CT CHEST SPINE W/O DYE: CPT

## 2022-12-01 PROCEDURE — 87651 STREP A DNA AMP PROBE: CPT

## 2022-12-01 RX ORDER — METHYLPREDNISOLONE 4 MG/1
TABLET ORAL
Qty: 1 EACH | Refills: 0 | Status: SHIPPED | OUTPATIENT
Start: 2022-12-01

## 2022-12-01 RX ORDER — HYDROCORTISONE ACETATE 25 MG/1
25 SUPPOSITORY RECTAL EVERY 12 HOURS
Qty: 14 SUPPOSITORY | Refills: 0 | Status: SHIPPED | OUTPATIENT
Start: 2022-12-01 | End: 2022-12-08

## 2022-12-01 RX ORDER — KETOROLAC TROMETHAMINE 30 MG/ML
60 INJECTION, SOLUTION INTRAMUSCULAR; INTRAVENOUS ONCE
Status: COMPLETED | OUTPATIENT
Start: 2022-12-01 | End: 2022-12-01

## 2022-12-01 RX ORDER — CYCLOBENZAPRINE HCL 10 MG
10 TABLET ORAL 3 TIMES DAILY PRN
Qty: 15 TABLET | Refills: 1 | Status: SHIPPED | OUTPATIENT
Start: 2022-12-01

## 2022-12-01 RX ADMIN — KETOROLAC TROMETHAMINE 60 MG: 30 INJECTION, SOLUTION INTRAMUSCULAR; INTRAVENOUS at 08:51

## 2022-12-01 NOTE — ED PROVIDER NOTES
"ED Provider Note    CHIEF COMPLAINT  Chief Complaint   Patient presents with    Back Pain     The pt reports back pain since november 10th. The pt reports getting treatment by doctor with no relief or symptoms. 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 The pt ambulatory with a steady gait. The pt reports sore throat for 3 weeks. No drooling noted. Pt able to maintain airway. The pt reports rectal area rash for 3 days.    Rash    Sore Throat       HPI  Holden Ruiz is a 42 y.o. male who presents with multiple problems.  First problem sore throat for 5 weeks, unknown etiology, intermittent in nature.  No shortness of breath, no rhinorrhea, no fever.  Second complaint, \"sores by my anus\".  Patient was concerned the ibuprofen is taken led to this discomfort.  No bloody stool, no diarrhea, no trauma.  Third complaint back pain.  He states he was injured at work 2 months ago, has had persistent low back pain, not improved with ibuprofen.  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  His work comp provider is [facility].  He states he has an appointment with them tomorrow but decided to come here first, did not sleep last night.  He states there was a second injury at work yesterday, \"a wheelbarrow full of cement was dropped on my back\".  Patient states he did not sleep last night secondary to increase of pain.  No acute numbness or weakness.  No chest pain or difficulty breathing, no abdominal pain.  No acute numbness or weakness to the lower extremities.  Patient appears uncomfortable sitting on bedside upon my arrival.  Patient is ambulatory.    REVIEW OF SYSTEMS    Constitutional: No fever  Respiratory: Cough or shortness of breath, no pleurisy  Cardiac: No chest pain or syncope  Gastrointestinal: No abdominal pain, no vomiting.  Perianal pain  Musculoskeletal: Lower thoracic and diffuse lumbar back pain.  No " "extremity pain  Neurologic: No acute numbness or weakness  ENT: Sore throat     All other systems are negative.       PAST MEDICAL HISTORY  Past Medical History:   Diagnosis Date    Acid reflux        FAMILY HISTORY  No family history on file.    SOCIAL HISTORY  Social History     Socioeconomic History    Marital status: Single   Tobacco Use    Smoking status: Never    Smokeless tobacco: Never   Substance and Sexual Activity    Alcohol use: No    Drug use: No       SURGICAL HISTORY  Past Surgical History:   Procedure Laterality Date    APPENDECTOMY LAPAROSCOPIC N/A 1/14/2016    Procedure: APPENDECTOMY LAPAROSCOPIC;  Surgeon: Geo Knutson M.D.;  Location: SURGERY [facility];  Service:        CURRENT MEDICATIONS  Home Medications       Reviewed by Cesar Duncan R.N. (Registered Nurse) on 12/01/22 at 0722  Med List Status: Partial     Medication Last Dose Status   dicyclomine (BENTYL) 20 MG Tab  Active   pantoprazole (PROTONIX) 40 MG Tablet Delayed Response  Active   polyethylene glycol 3350 (MIRALAX) 17 GM/SCOOP Powder  Active                    ALLERGIES  Allergies   Allergen Reactions    Penicillin G Rash and Itching     itching       PHYSICAL EXAM  VITAL SIGNS: /84   Pulse 96   Temp 36.4 °C (97.5 °F) (Temporal)   Resp 16   Ht 1.676 m (5' 6\")   Wt 91.5 kg (201 lb 11.5 oz)   SpO2 97%   BMI 32.56 kg/m²   Constitutional:  Non-toxic appearance.   HENT: No facial swelling.  Minimal erythema posterior pharynx, no exudate, no asymmetric swelling.  No submandibular adenopathy  Eyes: Anicteric, no conjunctivitis.     Cardiovascular: Normal heart rate, Normal rhythm  Pulmonary:  No wheezing, No rales.  Good bilateral air movement  Gastrointestinal: Soft, No tenderness, No distention or palpable mass  Skin: Warm, Dry, No cyanosis.  No bruising or swelling to his back.  No shingles.  Neurologic: Speech is clear, follows commands, facial expression is symmetrical.  Psychiatric: Affect normal,  Mood normal.  " Patient is calm and cooperative  Musculoskeletal: Lower thoracic and lumbar tenderness midline.  No flank or rib tenderness.    EKG/Labs  Results for orders placed or performed during the hospital encounter of 12/01/22   Group A Strep by PCR    Specimen: Throat   Result Value Ref Range    Group A Strep by PCR Not Detected Not Detected         RADIOLOGY/PROCEDURES  CT-TSPINE W/O PLUS RECONS   Final Result      CT of the thoracic spine without contrast within normal limits.      CT-LSPINE W/O PLUS RECONS   Final Result      CT of the lumbar spine without contrast within normal limits.            COURSE & MEDICAL DECISION MAKING  Pertinent Labs & Imaging studies reviewed. (See chart for details)  Patient with several complaints.  Regarding his previous back injury and reinjury yesterday, CT scan of the thoracic and lumbar spine are negative for acute fracture or dislocation.  He has appointment tomorrow at [facility] and is advised to keep this appointment for reevaluation and return to work instructions.  With evidence of pharyngitis, strep test obtained and is negative.  Etiology likely either allergic or viral, he is advised with the help of  no antibiotics are indicated.  He is to follow-up with primary doctor for recheck regarding this if not better in 1 week.  Regarding anal pain, he has hemorrhoid, has been prescribed Anusol HC and is again advised to follow-up with primary care doctor if no better in 1 week.  Patient is well-appearing upon discharge.  He states that Motrin was not helping his pain, I have prescribed Flexeril and Medrol Dosepak to help him with his back discomfort.    FINAL IMPRESSION     1. Midline thoracic back pain, unspecified chronicity        2. Lumbar back pain  methylPREDNISolone (MEDROL DOSEPAK) 4 MG Tablet Therapy Pack    cyclobenzaprine (FLEXERIL) 10 mg Tab      3. Hemorrhoids, unspecified hemorrhoid type  hydrocortisone (ANUSOL-HC) 25 MG Suppos      4. Viral  pharyngitis                        Electronically signed by: Jovanny Mccann M.D., 12/1/2022 8:39 AM

## 2022-12-01 NOTE — LETTER
[facility], EMERGENCY DEPT   [address], [place] [address]  Phone: Dept: [phone] - Fax:        [facility] Progress Report and Disability Certification  Date of Service: 12/1/2022   No Show:  No  Date / Time of Next Visit:     Claim Information   Patient Name: Holden Ruiz  Claim Number:     Employer: [employer] Date of Injury: 10/10/2022     Insurer / TPA: [place] ID / SSN:    Occupation: Finisher Diagnosis: Diagnoses of Midline thoracic back pain, unspecified chronicity, Lumbar back pain, Hemorrhoids, unspecified hemorrhoid type, and Viral pharyngitis were pertinent to this visit.    Medical Information   Related to Industrial Injury? Yes ***   Subjective Complaints:  Thoracic and lumbar back pain from injury 2 months ago and again yesterday.   Objective Findings: Midline lower thoracic and diffuse lumbar tenderness.  No acute neurologic findings.   Pre-Existing Condition(s):     Assessment:   Condition Same    Status: Additional Care Required  Comments:Follow-up tomorrow at Worker's Comp./[facility]  Permanent Disability:No    Plan: Medication    Diagnostics: CT  Comments:CT thoracic and lumbar spine negative    Comments:  Patient with injury ongoing pain from 2 months ago, reinjured yesterday states.  CT scans today have been negative.  Patient states ibuprofen has not helped his discomfort.  I have switched him to Flexeril and Medrol.  He has scheduled appointment tomorrow with Worker's Comp. at [facility], he is encouraged to keep this appointment.  Return to work and his restrictions will be as per Worker's Comp. evaluation    Disability Information   Status: Temporarily Totally Disabled    From:     Through:   Restrictions are:     Physical Restrictions   Sitting:    Standing:    Stooping:    Bending:      Squatting:    Walking:    Climbing:    Pushing:      Pulling:    Other:    Reaching Above Shoulder (L):    Reaching Above Shoulder (R):       Reaching Below Shoulder (L):    Reaching Below Shoulder (R):      Not to exceed Weight Limits   Carrying(hrs):   Weight Limit(lb):   Lifting(hrs):   Weight  Limit(lb):     Comments:      Repetitive Actions   Hands: i.e. Fine Manipulations from Grasping:     Feet: i.e. Operating Foot Controls:     Driving / Operate Machinery:     Physician Name: Jovanny Mccann Physician Signature: JOVANNY Sanchez M.D. e-Signature:  , Medical Director   Clinic Name / Location: [facility], EMERGENCY DEPT  [address] [place] [address]  [phone]     Clinic Phone Number: Dept: [phone]   Appointment Time:  Visit Start Time:    Check-In Time:  6:58 AM Visit Discharge Time:    Original-Treating Physician or Chiropractor    Page 2-Insurer/TPA    Page 3-Employer    Page 4-Employee

## 2022-12-01 NOTE — LETTER
[facility], EMERGENCY DEPT   [address], [place] [address]  Phone: Dept: [phone] - Fax:        [facility] Progress Report and Disability Certification  Date of Service: 12/1/2022   No Show:  No  Date / Time of Next Visit:     Claim Information   Patient Name: Holden Ruiz  Claim Number:     Employer:   [employer] Date of Injury: 10/10/2022     Insurer / TPA: [employer] ID / SSN: [id number]   Occupation: Finisher Diagnosis: Diagnoses of Midline thoracic back pain, unspecified chronicity, Lumbar back pain, Hemorrhoids, unspecified hemorrhoid type, and Viral pharyngitis were pertinent to this visit.    Medical Information   Related to Industrial Injury? Yes    Subjective Complaints:  Thoracic and lumbar back pain from injury 2 months ago and again yesterday.   Objective Findings: Midline lower thoracic and diffuse lumbar tenderness.  No acute neurologic findings.   Pre-Existing Condition(s):     Assessment:   Condition Same    Status: Additional Care Required  Comments:Follow-up tomorrow at Worker's Comp./[facility]  Permanent Disability:No    Plan: Medication    Diagnostics: CT  Comments:CT thoracic and lumbar spine negative    Comments:  Patient with injury ongoing pain from 2 months ago, reinjured yesterday states.  CT scans today have been negative.  Patient states ibuprofen has not helped his discomfort.  I have switched him to Flexeril and Medrol.  He has scheduled appointment tomorrow with Worker's Comp. at [facility], he is encouraged to keep this appointment.  Return to work and his restrictions will be as per Worker's Comp. evaluation    Disability Information   Status: Temporarily Totally Disabled    From:     Through:   Restrictions are:     Physical Restrictions   Sitting:    Standing:    Stooping:    Bending:      Squatting:    Walking:    Climbing:    Pushing:      Pulling:    Other:    Reaching Above Shoulder (L):   Reaching Above  Shoulder (R):       Reaching Below Shoulder (L):    Reaching Below Shoulder (R):      Not to exceed Weight Limits   Carrying(hrs):   Weight Limit(lb):   Lifting(hrs):   Weight  Limit(lb):     Comments:      Repetitive Actions   Hands: i.e. Fine Manipulations from Grasping:     Feet: i.e. Operating Foot Controls:     Driving / Operate Machinery:     Physician Name: Jovanny Mccann Physician Signature: JOVANNY Sanchez M.D. e-Signature:  , Medical Director   Clinic Name / Location: [facility], EMERGENCY DEPT  [address] [place] [id number]  [phone]     Clinic Phone Number: Dept: [phone]   Appointment Time:  Visit Start Time:    Check-In Time:  6:58 AM Visit Discharge Time:    Original-Treating Physician or Chiropractor    Page 2-Insurer/TPA    Page 3-Employer    Page 4-Employee

## 2022-12-01 NOTE — ED TRIAGE NOTES
"Chief Complaint   Patient presents with    Back Pain     The pt reports back pain since november 10th. The pt reports getting treatment by doctor with no relief or symptoms. 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 The pt ambulatory with a steady gait. The pt reports sore throat for 3 weeks. No drooling noted. Pt able to maintain airway. The pt reports rectal area rash for 3 days.    Rash    Sore Throat       Pt ambulatory to triage. Pt A&Ox4, for the above complaint.     Pt to lobby . Pt educated on alerting staff in changes to condition. Pt verbalized understanding.     /84   Pulse 96   Temp 36.4 °C (97.5 °F) (Temporal)   Resp 16   Ht 1.676 m (5' 6\")   Wt 91.5 kg (201 lb 11.5 oz)   SpO2 97%   BMI 32.56 kg/m²       "

## 2022-12-01 NOTE — DISCHARGE INSTRUCTIONS
Follow-up with [facility] tomorrow as scheduled.  See your primary doctor regarding hemorrhoid and sore throat in 1 week if not better.

## 2022-12-01 NOTE — LETTER
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    Medical Information   Related to Industrial Injury?   ***   Subjective Complaints:      Objective Findings:     Pre-Existing Condition(s):     Assessment:        Status:    Permanent Disability:     Plan:      Diagnostics:      Comments:       Disability Information   Status:      From:     Through:   Restrictions are:     Physical Restrictions   Sitting:    Standing:    Stooping:    Bending:      Squatting:    Walking:    Climbing:    Pushing:      Pulling:    Other:    Reaching Above Shoulder (L):   Reaching Above Shoulder (R):       Reaching Below Shoulder (L):    Reaching Below Shoulder (R):      Not to exceed Weight Limits   Carrying(hrs):   Weight Limit(lb):   Lifting(hrs):   Weight  Limit(lb):     Comments:      Repetitive Actions   Hands: i.e. Fine Manipulations from Grasping:     Feet: i.e. Operating Foot Controls:     Driving / Operate Machinery:     Physician Name: Jovanny Mccann Physician Signature:   e-Signature:  , Medical Director   Clinic Name / Location: [facility], EMERGENCY DEPT  [address] [place] [id number] [phone]     Clinic Phone Number: Dept: [phone]   Appointment Time:  Visit Start Time:    Check-In Time:  6:58 AM Visit Discharge Time:    Original-Treating Physician or Chiropractor    Page 2-Insurer/TPA    Page 3-Employer    Page 4-Employee

## 2022-12-01 NOTE — ED NOTES
Pt educated on DC instructions, medication and follow up care as directed.  All questions answered.  VVS.  ABC's intact.  Gait steady.

## 2022-12-12 NOTE — PROGRESS NOTES
Called MsLupis [place] to see how she was doing. Left a message with my contact information and asked her to call back at her convenience.   Subjective:   CHIEF COMPLAINT  Chief Complaint   Patient presents with   • GI Problem     stomach pain, x3 days, left shoulder pain x3 weeks, want work note       HPI  Patient does not speak English and  was used to aid with the encounter     Holden Whitney is a 41 y.o. male who presents with a chief complaint of poorly characterized abdominal discomfort.  Symptoms started approximately 2 months ago and was seen in urgent care and treated symptomatically.  Since that time he has been experiencing intermittent central abdominal discomfort which seems to radiate up from his umbilicus towards his throat.  He has had a normal diet without any nausea or vomiting.  Food does not trigger the symptoms.  There are no specific aggravating factors.  No alleviating factors.  He does report experiencing periods of constipation followed by loose stools.  PSH of appendectomy.     Addition the patient says he is having left shoulder pain.  He believes this is possibly due to the Covid shot.    REVIEW OF SYSTEMS  General: no fever or chills  GI: no nausea or vomiting  See HPI for further details.    PAST MEDICAL HISTORY  There are no problems to display for this patient.      SURGICAL HISTORY   has a past surgical history that includes appendectomy laparoscopic (N/A, 1/14/2016).    ALLERGIES  Allergies   Allergen Reactions   • Penicillin G Rash and Itching     itching       CURRENT MEDICATIONS  Home Medications     Reviewed by Lexy Rose, Student (Medical Assistant) on 01/31/22 at 0819  Med List Status: <None>   Medication Last Dose Status   ondansetron (ZOFRAN ODT) 4 MG TABLET DISPERSIBLE Not Taking Flagged for Removal                SOCIAL HISTORY  Social History     Tobacco Use   • Smoking status: Never Smoker   • Smokeless tobacco: Never Used   Substance and Sexual Activity   • Alcohol use: No   • Drug use: No   • Sexual activity: Not on file       FAMILY HISTORY  History reviewed. No pertinent family  "history.       Objective:   PHYSICAL EXAM  VITAL SIGNS: /70 (BP Location: Left arm, Patient Position: Sitting, BP Cuff Size: Adult)   Pulse 75   Temp 36.8 °C (98.2 °F) (Temporal)   Resp 16   Ht 1.676 m (5' 6\")   Wt 91.9 kg (202 lb 9.6 oz)   SpO2 96%   BMI 32.70 kg/m²     Gen: no acute distress, normal voice  Skin: dry, intact, moist mucosal membranes  Lungs: CTAB w/ symmetric expansion  CV: RRR w/o murmurs or clicks  Abdomen: Bowel sounds normal, soft, very mild global tenderness w/o rebound or guarding.   Psych: normal affect, normal judgement, alert, awake    Assessment/Plan:     1. Irritable bowel syndrome with both constipation and diarrhea  polyethylene glycol 3350 (MIRALAX) 17 GM/SCOOP Powder    dicyclomine (BENTYL) 20 MG Tab    Referral back to [facility]   2. Gastroesophageal reflux disease, unspecified whether esophagitis present  pantoprazole (PROTONIX) 40 MG Tablet Delayed Response    Referral back to [facility]CP   3. Acute pain of left shoulder     1-2)Suspect symptoms are multifactorial due to in part IBS, constipation predominant, and likely underlying acid reflux.  -Ordered Rx for Bentyl  -Ordered Rx for Protonix  -Ordered Rx for MiraLAX  -Ordered referral to establish primary care.  If develop any new/worsening symptoms follow-up in urgent care as needed.  Patient understood everything discussed.  All questions were answered.     3) patient believes this is secondary to the Covid shot.  Encouraged NSAIDs and Tylenol as needed.  Referral was submitted to follow-up with primary care for reevaluation continue management.    Differential diagnosis, natural history, supportive care, and indications for immediate follow-up discussed. All questions answered. Patient agrees with the plan of care.    Follow-up as needed if symptoms worsen or fail to improve to PCP, Urgent care or Emergency Room.    30 minutes was spent caring for this patient on the day of the encounter which included face-to-face " time, discussing the diagnosis, medical management, follow-up, emergency room precautions and completion of the chart. This does not include time spent on separately billable procedures/tests      Please note that this dictation was created using voice recognition software. I have made a reasonable attempt to correct obvious errors, but I expect that there are errors of grammar and possibly content that I did not discover before finalizing the note.

## 2023-01-09 ENCOUNTER — APPOINTMENT (OUTPATIENT)
Dept: RADIOLOGY | Facility: MEDICAL CENTER | Age: 43
End: 2023-01-09
Attending: PHYSICAL MEDICINE & REHABILITATION
Payer: COMMERCIAL

## 2023-01-09 DIAGNOSIS — M54.16 LUMBAR RADICULOPATHY: ICD-10-CM

## 2023-01-09 PROCEDURE — 72148 MRI LUMBAR SPINE W/O DYE: CPT

## 2025-07-06 ENCOUNTER — OFFICE VISIT (OUTPATIENT)
Dept: URGENT CARE | Facility: PHYSICIAN GROUP | Age: 45
End: 2025-07-06
Payer: COMMERCIAL

## 2025-07-06 VITALS
TEMPERATURE: 96.8 F | DIASTOLIC BLOOD PRESSURE: 60 MMHG | HEART RATE: 69 BPM | HEIGHT: 66 IN | OXYGEN SATURATION: 100 % | RESPIRATION RATE: 16 BRPM | SYSTOLIC BLOOD PRESSURE: 110 MMHG | BODY MASS INDEX: 31.18 KG/M2 | WEIGHT: 194 LBS

## 2025-07-06 DIAGNOSIS — F52.4 PREMATURE EJACULATION: ICD-10-CM

## 2025-07-06 DIAGNOSIS — J30.9 ALLERGIC RHINITIS, UNSPECIFIED SEASONALITY, UNSPECIFIED TRIGGER: ICD-10-CM

## 2025-07-06 DIAGNOSIS — H10.33 ACUTE CONJUNCTIVITIS OF BOTH EYES, UNSPECIFIED ACUTE CONJUNCTIVITIS TYPE: Primary | ICD-10-CM

## 2025-07-06 PROCEDURE — 3078F DIAST BP <80 MM HG: CPT

## 2025-07-06 PROCEDURE — 99204 OFFICE O/P NEW MOD 45 MIN: CPT

## 2025-07-06 PROCEDURE — 3074F SYST BP LT 130 MM HG: CPT

## 2025-07-06 RX ORDER — OLOPATADINE HYDROCHLORIDE 1 MG/ML
1 SOLUTION OPHTHALMIC 2 TIMES DAILY
Qty: 5 ML | Refills: 1 | Status: SHIPPED | OUTPATIENT
Start: 2025-07-06

## 2025-07-06 RX ORDER — CETIRIZINE HYDROCHLORIDE 10 MG/1
10 TABLET ORAL DAILY
Qty: 30 TABLET | Refills: 0 | Status: SHIPPED | OUTPATIENT
Start: 2025-07-06

## 2025-07-06 RX ORDER — FLUTICASONE PROPIONATE 50 MCG
1 SPRAY, SUSPENSION (ML) NASAL DAILY
Qty: 16 G | Refills: 0 | Status: SHIPPED | OUTPATIENT
Start: 2025-07-06

## 2025-07-06 ASSESSMENT — ENCOUNTER SYMPTOMS
DIZZINESS: 0
PHOTOPHOBIA: 0
CHILLS: 0
SORE THROAT: 0
DOUBLE VISION: 0
FEVER: 0
WHEEZING: 0
BLURRED VISION: 0
STRIDOR: 0
EYE REDNESS: 1
VOMITING: 0
SHORTNESS OF BREATH: 0
MYALGIAS: 0
NAUSEA: 0
EYE DISCHARGE: 0
PALPITATIONS: 0
HEADACHES: 0
EYE PAIN: 0
SPUTUM PRODUCTION: 0
ABDOMINAL PAIN: 0
DIARRHEA: 0
COUGH: 0

## 2025-07-06 NOTE — LETTER
July 6, 2025    To Whom It May Concern:         This is confirmation that Holden Ruiz attended his scheduled appointment with DANIEL Green on 7/06/25.  They are medically excused from work from 07/06/2025 through 07/07/2025. They may return to work on 07/08/2025 providing their symptoms are improving.            If you have any questions please do not hesitate to call me at the phone number listed below.    Sincerely,          MUSHTAQ Green.  [phone]

## 2025-07-06 NOTE — PROGRESS NOTES
Subjective:   Holden Ruiz is a 45 y.o. male who presents for Eye Problem (Eye redness,x4 days)          I introduced myself to the patient and informed them that I am a Family Nurse Practitioner.    Ipad  Rian [id number] utilized throughout entire appointment    HPI:Holden is a 45 year-old male who comes in today c/o both eyes are red and itchy. He also has mild nasal congestion and runny nose with clear drainage. Onset was 4 days ago. He states it started with both eyes at the same time. Denies any mucopurulent drainage or crusting of the eyelids.  Patient describes symptoms as constant. They describe the pain as burning. Aggravating factors include none. Relieving factors include none. Treatments tried at home include  none . They describe their symptoms as moderate.  At the very end of the appointment today, patient via the  brings up another problem, states he is having premature ejaculation issues and asks if I can prescribe some medication to help prevent this.      Review of Systems   Constitutional:  Negative for chills, fever and malaise/fatigue.   HENT:  Positive for congestion. Negative for ear pain and sore throat.    Eyes:  Positive for redness. Negative for blurred vision, double vision, photophobia, pain and discharge.   Respiratory:  Negative for cough, sputum production, shortness of breath, wheezing and stridor.    Cardiovascular:  Negative for chest pain and palpitations.   Gastrointestinal:  Negative for abdominal pain, diarrhea, nausea and vomiting.   Genitourinary:  Negative for dysuria.   Musculoskeletal:  Negative for myalgias.   Skin:  Negative for rash.   Neurological:  Negative for dizziness and headaches.       Medications: reviewed with patient, updated med sheet    Allergies: Penicillin g    Problem List: does not have a problem list on file.    Surgical History:  Past Surgical History:   Procedure Laterality Date    APPENDECTOMY LAPAROSCOPIC N/A  "1/14/2016    Procedure: APPENDECTOMY LAPAROSCOPIC;  Surgeon: Geo Knutson M.D.;  Location: SURGERY [facility];  Service:        Past Social Hx:   reports that he has never smoked. He has never used smokeless tobacco. He reports that he does not drink alcohol and does not use drugs.     Past Family Hx:   family history is not on file.     Problem list, medications, and allergies reviewed by myself today in Epic.   I have documented what I find to be significant in regards to past medical, social, family and surgical history  in my HPI or under PMH/PSH/FH review section, otherwise it is noncontributory     Objective:     /60   Pulse 69   Temp 36 °C (96.8 °F) (Temporal)   Resp 16   Ht 1.676 m (5' 6\")   Wt 88 kg (194 lb)   SpO2 100%   BMI 31.31 kg/m²     During this visit, appropriate PPE was worn, and hand hygiene was performed.    Physical Exam  Vitals reviewed.   Constitutional:       General: He is not in acute distress.     Appearance: Normal appearance. He is not ill-appearing or toxic-appearing.   HENT:      Head: Normocephalic and atraumatic.      Right Ear: Tympanic membrane, ear canal and external ear normal. There is no impacted cerumen.      Left Ear: Tympanic membrane, ear canal and external ear normal. There is no impacted cerumen.      Nose: Congestion and rhinorrhea present. Rhinorrhea is clear.      Mouth/Throat:      Pharynx: Oropharynx is clear. No oropharyngeal exudate or posterior oropharyngeal erythema.   Eyes:      General: Lids are normal. Lids are everted, no foreign bodies appreciated. Vision grossly intact. Gaze aligned appropriately. No scleral icterus.        Right eye: Discharge present. No foreign body.         Left eye: Discharge present.No foreign body.      Extraocular Movements:      Right eye: Normal extraocular motion and no nystagmus.      Left eye: Normal extraocular motion and no nystagmus.      Conjunctiva/sclera:      Right eye: Right conjunctiva is " injected. Exudate present. No chemosis or hemorrhage.     Left eye: Left conjunctiva is injected. No chemosis, exudate or hemorrhage.     Pupils: Pupils are equal, round, and reactive to light.   Cardiovascular:      Rate and Rhythm: Normal rate and regular rhythm.      Heart sounds: Normal heart sounds. No murmur heard.     No friction rub. No gallop.   Pulmonary:      Effort: Pulmonary effort is normal. No respiratory distress.      Breath sounds: Normal breath sounds. No wheezing, rhonchi or rales.   Abdominal:      General: There is no distension.   Musculoskeletal:         General: Normal range of motion.      Cervical back: Normal range of motion. No rigidity.      Right lower leg: No edema.      Left lower leg: No edema.   Lymphadenopathy:      Cervical: No cervical adenopathy.   Skin:     General: Skin is warm and dry.      Coloration: Skin is not jaundiced.   Neurological:      General: No focal deficit present.      Mental Status: He is alert and oriented to person, place, and time. Mental status is at baseline.   Psychiatric:         Mood and Affect: Mood normal.         Behavior: Behavior normal.         Thought Content: Thought content normal.         Judgment: Judgment normal.         Assessment/Plan:     Diagnosis and associated orders:     1. Acute conjunctivitis of both eyes, unspecified acute conjunctivitis type  olopatadine (PATANOL) 0.1 % ophthalmic solution    cetirizine (ZYRTEC ALLERGY) 10 MG Tab      2. Allergic rhinitis, unspecified seasonality, unspecified trigger  fluticasone (FLONASE) 50 MCG/ACT nasal spray      3. Premature ejaculation  Referral to Urology         Comments/MDM:     1. Acute conjunctivitis of both eyes, unspecified acute conjunctivitis type (Primary)  Patient with mild injection of bilateral conjunctive, no evidence of foreign object, no mucopurulent drainage or crusting of eyelashes, patient denies any other viral URI symptoms, also has some nasal congestion, rhinitis  with clear drainage, suggested the patient this is not consistent with bacterial conjunctivitis or pinkeye, most likely consistent with allergic conjunctivitis.  Will have him take Zyrtec daily and use olopatadine  Supportive care measures discussed  - olopatadine (PATANOL) 0.1 % ophthalmic solution; Administer 1 Drop into both eyes 2 times a day.  Dispense: 5 mL; Refill: 1  - cetirizine (ZYRTEC ALLERGY) 10 MG Tab; Take 1 Tablet by mouth every day.  Dispense: 30 Tablet; Refill: 0  2. Allergic rhinitis, unspecified seasonality, unspecified trigger  - cetirizine (ZYRTEC ALLERGY) 10 MG Tab; Take 1 Tablet by mouth every day.  Dispense: 30 Tablet; Refill: 0  - fluticasone (FLONASE) 50 MCG/ACT nasal spray; Administer 1 Spray into affected nostril(S) every day.  Dispense: 16 g; Refill: 0    3. Premature ejaculation  - Referral to Urology           Pt is clinically stable at today's acute urgent care visit. Vital signs are normal and reassuring.  No acute distress noted. There was no immediate clinical indication for the necessity of emergency department evaluation or inpatient admission.  Appropriate for outpatient management at this time. Patient was amendable to a trial of outpatient management.        I personally reviewed prior external notes and test results pertinent to today's visit.  I have independently reviewed and interpreted all diagnostics ordered during this urgent care acute visit.        Please note that this dictation was created using voice recognition software. I have made a reasonable attempt to correct obvious errors, but I expect that there are errors of grammar and possibly content that I did not discover before finalizing the note.    This note was electronically signed by Basilio JARVIS, LANE, ELEUTERIO, PIPPA

## 2025-07-10 ASSESSMENT — VISUAL ACUITY: OU: 1

## 2025-07-12 ENCOUNTER — OFFICE VISIT (OUTPATIENT)
Dept: URGENT CARE | Facility: PHYSICIAN GROUP | Age: 45
End: 2025-07-12
Payer: COMMERCIAL

## 2025-07-12 VITALS
HEART RATE: 83 BPM | HEIGHT: 66 IN | WEIGHT: 194 LBS | SYSTOLIC BLOOD PRESSURE: 118 MMHG | TEMPERATURE: 98.5 F | RESPIRATION RATE: 20 BRPM | OXYGEN SATURATION: 98 % | BODY MASS INDEX: 31.18 KG/M2 | DIASTOLIC BLOOD PRESSURE: 60 MMHG

## 2025-07-12 DIAGNOSIS — H10.33 ACUTE CONJUNCTIVITIS OF BOTH EYES, UNSPECIFIED ACUTE CONJUNCTIVITIS TYPE: Primary | ICD-10-CM

## 2025-07-12 PROCEDURE — 3078F DIAST BP <80 MM HG: CPT | Performed by: STUDENT IN AN ORGANIZED HEALTH CARE EDUCATION/TRAINING PROGRAM

## 2025-07-12 PROCEDURE — 3074F SYST BP LT 130 MM HG: CPT | Performed by: STUDENT IN AN ORGANIZED HEALTH CARE EDUCATION/TRAINING PROGRAM

## 2025-07-12 PROCEDURE — 99213 OFFICE O/P EST LOW 20 MIN: CPT | Performed by: STUDENT IN AN ORGANIZED HEALTH CARE EDUCATION/TRAINING PROGRAM

## 2025-07-12 RX ORDER — POLYMYXIN B SULFATE AND TRIMETHOPRIM 1; 10000 MG/ML; [USP'U]/ML
1 SOLUTION OPHTHALMIC EVERY 4 HOURS
Qty: 10 ML | Refills: 0 | Status: SHIPPED | OUTPATIENT
Start: 2025-07-12 | End: 2025-07-19

## 2025-07-12 RX ORDER — KETOROLAC TROMETHAMINE 5 MG/ML
1 SOLUTION OPHTHALMIC 4 TIMES DAILY PRN
Qty: 10 ML | Refills: 0 | Status: SHIPPED | OUTPATIENT
Start: 2025-07-12 | End: 2025-07-19

## 2025-07-13 NOTE — PROGRESS NOTES
"Subjective:   Holden Ruiz is a 45 y.o. male who presents for Pink Eye (Was in on 7/6/25, not getting better )      HPI:  45-year-old male presents to the urgent care for continued bilateral eye redness discomfort.  Was seen previously in the urgent care on 7/6/2025 and diagnosed with allergic conjunctivitis and started on Pataday eyedrops.  States he has not noticed any improvement in those drops.  States that his symptoms started after some concrete fell into his eyes.  Not have any blurred vision, double vision, or photophobia.  Does not feel like there is anything still stuck in his eye.    Medications:    cetirizine Tabs  cyclobenzaprine Tabs  dicyclomine Tabs  fluticasone  ketorolac Soln  methylPREDNISolone Tbpk  olopatadine  pantoprazole Tbec  polyethylene glycol 3350 Powd  polymixin-trimethoprim Soln    Allergies: Penicillin g    Problem List: Holden Ruiz does not have a problem list on file.    Surgical History:  Past Surgical History:   Procedure Laterality Date    APPENDECTOMY LAPAROSCOPIC N/A 1/14/2016    Procedure: APPENDECTOMY LAPAROSCOPIC;  Surgeon: Geo Knutson M.D.;  Location: SURGERY [facility];  Service:        Past Social Hx: Holden Ruiz  reports that he has never smoked. He has never used smokeless tobacco. He reports that he does not drink alcohol and does not use drugs.     Past Family Hx:  Holden Ruiz family history is not on file.     Problem list, medications, and allergies reviewed by myself today in Epic.     Objective:     /60 (BP Location: Right arm, Patient Position: Sitting, BP Cuff Size: Adult)   Pulse 83   Temp 36.9 °C (98.5 °F) (Temporal)   Resp 20   Ht 1.676 m (5' 6\")   Wt 88 kg (194 lb)   SpO2 98%   BMI 31.31 kg/m²     Physical Exam  HENT:      Head:      Comments: Fluorescein eye exam shows no corneal abrasion or ulceration bilaterally.  No dendritic lesions bilaterally.  Eyes:      General:         Right eye: No foreign body, " discharge or hordeolum.         Left eye: Discharge present.No foreign body or hordeolum.      Conjunctiva/sclera:      Right eye: Right conjunctiva is injected. No chemosis or exudate.     Left eye: Left conjunctiva is injected. Chemosis present. No exudate.     Comments: Small amount of yellow discharge in the left eye         Assessment/Plan:     Diagnosis and associated orders:     1. Acute conjunctivitis of both eyes, unspecified acute conjunctivitis type  ketorolac (ACULAR) 0.5 % Solution    polymixin-trimethoprim (POLYTRIM) 13449-1.1 UNIT/ML-% Solution         Comments/MDM:     Patient's presentation physical exam findings are consistent with acute conjunctivitis of both eyes.  I do believe this is likely traumatic conjunctivitis/inflammatory due to concrete falling in his eyes approximately 1 week ago.  No signs of corneal abrasion or ulceration at this time.  Does have a small amount of discharge in the left eye.  At this time, I will start him on ketorolac eyedrops for anti-inflammatory effect.  Polytrim eyedrops also prescribed.  Return if symptoms not improving or worsening.         Differential diagnosis, natural history, supportive care, and indications for immediate follow-up discussed.    Advised the patient to follow-up with the primary care physician for recheck, reevaluation, and consideration of further management.    Please note that this dictation was created using voice recognition software. I have made a reasonable attempt to correct obvious errors, but I expect that there are errors of grammar and possibly content that I did not discover before finalizing the note.    Electronically signed by Basilio Briceño PA-C.

## 2025-08-21 ENCOUNTER — OFFICE VISIT (OUTPATIENT)
Dept: URGENT CARE | Facility: PHYSICIAN GROUP | Age: 45
End: 2025-08-21
Payer: COMMERCIAL

## 2025-08-21 VITALS
HEART RATE: 85 BPM | BODY MASS INDEX: 29.09 KG/M2 | SYSTOLIC BLOOD PRESSURE: 118 MMHG | WEIGHT: 196.4 LBS | DIASTOLIC BLOOD PRESSURE: 70 MMHG | TEMPERATURE: 97.3 F | RESPIRATION RATE: 16 BRPM | HEIGHT: 69 IN | OXYGEN SATURATION: 98 %

## 2025-08-21 DIAGNOSIS — M77.8 RIGHT ELBOW TENDONITIS: ICD-10-CM

## 2025-08-21 DIAGNOSIS — L03.213 PERIORBITAL CELLULITIS OF RIGHT EYE: Primary | ICD-10-CM

## 2025-08-21 PROCEDURE — 3078F DIAST BP <80 MM HG: CPT

## 2025-08-21 PROCEDURE — 3074F SYST BP LT 130 MM HG: CPT

## 2025-08-21 PROCEDURE — 1125F AMNT PAIN NOTED PAIN PRSNT: CPT

## 2025-08-21 PROCEDURE — 99213 OFFICE O/P EST LOW 20 MIN: CPT

## 2025-08-21 RX ORDER — CLINDAMYCIN HYDROCHLORIDE 300 MG/1
300 CAPSULE ORAL 3 TIMES DAILY
Qty: 21 CAPSULE | Refills: 0 | Status: SHIPPED | OUTPATIENT
Start: 2025-08-21 | End: 2025-08-28

## 2025-08-21 RX ORDER — PREDNISONE 10 MG/1
10 TABLET ORAL 2 TIMES DAILY
Qty: 6 TABLET | Refills: 0 | Status: SHIPPED | OUTPATIENT
Start: 2025-08-21 | End: 2025-08-24

## 2025-08-21 RX ORDER — IBUPROFEN 800 MG/1
800 TABLET, FILM COATED ORAL EVERY 8 HOURS PRN
Qty: 30 TABLET | Refills: 0 | Status: SHIPPED | OUTPATIENT
Start: 2025-08-21

## 2025-08-21 ASSESSMENT — ENCOUNTER SYMPTOMS
PHOTOPHOBIA: 0
EYE REDNESS: 0
DOUBLE VISION: 0
EYE DISCHARGE: 0
BLURRED VISION: 0
EYE PAIN: 1

## 2025-08-21 ASSESSMENT — PAIN SCALES - GENERAL: PAINLEVEL_OUTOF10: 4=SLIGHT-MODERATE PAIN
